# Patient Record
Sex: FEMALE | Race: BLACK OR AFRICAN AMERICAN | NOT HISPANIC OR LATINO | URBAN - METROPOLITAN AREA
[De-identification: names, ages, dates, MRNs, and addresses within clinical notes are randomized per-mention and may not be internally consistent; named-entity substitution may affect disease eponyms.]

---

## 2021-04-22 ENCOUNTER — TELEPHONE ENCOUNTER (OUTPATIENT)
Dept: URBAN - METROPOLITAN AREA CLINIC 92 | Facility: CLINIC | Age: 21
End: 2021-04-22

## 2021-04-22 ENCOUNTER — OFFICE VISIT (OUTPATIENT)
Dept: URBAN - NONMETROPOLITAN AREA CLINIC 2 | Facility: CLINIC | Age: 21
End: 2021-04-22
Payer: COMMERCIAL

## 2021-04-22 ENCOUNTER — DASHBOARD ENCOUNTERS (OUTPATIENT)
Age: 21
End: 2021-04-22

## 2021-04-22 DIAGNOSIS — R10.13 DYSPEPSIA: ICD-10-CM

## 2021-04-22 PROBLEM — 162031009: Status: ACTIVE | Noted: 2021-04-22

## 2021-04-22 PROCEDURE — 99203 OFFICE O/P NEW LOW 30 MIN: CPT | Performed by: INTERNAL MEDICINE

## 2021-04-22 RX ORDER — SUCRALFATE 1 G
1 TABLET ON AN EMPTY STOMACH TABLET ORAL TWICE A DAY
Qty: 60 | Refills: 0 | OUTPATIENT
Start: 2021-04-22 | End: 2021-05-22

## 2021-04-22 NOTE — HPI-TODAY'S VISIT:
19 yo F 5th yr at [employer] w a major in interior design. from [place] wants a job in [employer] has had upper GI probs since middle school which have exacerbated currently notes -multiple sm eructations, deepthi PC -sensation of mucous always in the back of her throat but no production of phlegm -constant bitter/sour taste and poss halitosis -freq dry cough is not awakened from sleep w any of these symptoms. +/- gas. no bloating/distension. from her descripton, had an UBT in [place] 9/2020,  which was negative had an ENT eval which was negative in 2020.p trials of omeprazole and protonix wo rx

## 2021-05-10 ENCOUNTER — TELEPHONE ENCOUNTER (OUTPATIENT)
Dept: URBAN - NONMETROPOLITAN AREA CLINIC 2 | Facility: CLINIC | Age: 21
End: 2021-05-10

## 2021-06-04 ENCOUNTER — OFFICE VISIT (OUTPATIENT)
Dept: URBAN - NONMETROPOLITAN AREA SURGERY CENTER 1 | Facility: SURGERY CENTER | Age: 21
End: 2021-06-04

## 2021-06-25 ENCOUNTER — OFFICE VISIT (OUTPATIENT)
Dept: URBAN - NONMETROPOLITAN AREA CLINIC 2 | Facility: CLINIC | Age: 21
End: 2021-06-25